# Patient Record
Sex: MALE | Race: WHITE | NOT HISPANIC OR LATINO | Employment: UNEMPLOYED | URBAN - METROPOLITAN AREA
[De-identification: names, ages, dates, MRNs, and addresses within clinical notes are randomized per-mention and may not be internally consistent; named-entity substitution may affect disease eponyms.]

---

## 2017-11-20 PROBLEM — R31.0 GROSS HEMATURIA: Status: ACTIVE | Noted: 2017-11-20

## 2017-11-20 PROBLEM — N41.1 PROSTATITIS, CHRONIC: Status: ACTIVE | Noted: 2017-11-20

## 2017-12-13 PROBLEM — M48.061 LUMBAR STENOSIS: Status: ACTIVE | Noted: 2017-12-13

## 2018-02-21 PROBLEM — M47.816 LUMBAR SPONDYLOSIS: Status: ACTIVE | Noted: 2018-02-21

## 2018-06-06 PROBLEM — Z98.1 S/P LUMBAR SPINAL FUSION: Status: ACTIVE | Noted: 2018-06-06

## 2019-09-20 ENCOUNTER — HOSPITAL ENCOUNTER (EMERGENCY)
Facility: MEDICAL CENTER | Age: 54
End: 2019-09-20

## 2019-09-20 VITALS
TEMPERATURE: 97.2 F | HEIGHT: 66 IN | BODY MASS INDEX: 24.11 KG/M2 | HEART RATE: 80 BPM | WEIGHT: 150 LBS | DIASTOLIC BLOOD PRESSURE: 81 MMHG | SYSTOLIC BLOOD PRESSURE: 135 MMHG | RESPIRATION RATE: 14 BRPM | OXYGEN SATURATION: 95 %

## 2019-09-20 PROCEDURE — 302449 STATCHG TRIAGE ONLY (STATISTIC)

## 2019-09-21 NOTE — ED TRIAGE NOTES
"C/o blood in urine x several years comes on and off. \"huge clots come out\".  Denies chest pain, sob, n/v, fever sweat or chills.   "

## 2020-05-04 PROBLEM — R35.0 FREQUENCY OF MICTURITION: Status: ACTIVE | Noted: 2020-05-04

## 2020-05-18 PROBLEM — R35.0 BENIGN PROSTATIC HYPERPLASIA WITH URINARY FREQUENCY: Status: ACTIVE | Noted: 2020-05-18

## 2020-05-18 PROBLEM — N40.1 BENIGN PROSTATIC HYPERPLASIA WITH URINARY FREQUENCY: Status: ACTIVE | Noted: 2020-05-18

## 2020-06-15 PROBLEM — D69.6 THROMBOCYTOPENIA (HCC): Status: ACTIVE | Noted: 2020-06-15

## 2020-06-15 PROBLEM — Z87.19 HX OF DIVERTICULITIS OF COLON: Status: ACTIVE | Noted: 2020-06-15

## 2020-06-15 PROBLEM — K76.0 FATTY LIVER: Status: ACTIVE | Noted: 2020-06-15

## 2020-06-15 PROBLEM — I10 HTN (HYPERTENSION): Status: ACTIVE | Noted: 2020-06-15

## 2020-06-15 PROBLEM — J30.9 ALLERGIC RHINITIS: Status: ACTIVE | Noted: 2020-06-15

## 2020-06-15 PROBLEM — L40.9 PSORIASIS: Status: ACTIVE | Noted: 2020-06-15

## 2020-06-15 PROBLEM — K21.9 GERD (GASTROESOPHAGEAL REFLUX DISEASE): Status: ACTIVE | Noted: 2020-06-15

## 2020-06-15 PROBLEM — Z72.0 TOBACCO USE: Status: ACTIVE | Noted: 2020-06-15

## 2020-06-15 PROBLEM — F10.10 ALCOHOL ABUSE: Status: ACTIVE | Noted: 2020-06-15

## 2020-10-16 PROBLEM — Z20.822 SUSPECTED COVID-19 VIRUS INFECTION: Status: ACTIVE | Noted: 2020-10-16

## 2020-10-16 PROBLEM — R55 SYNCOPE: Status: ACTIVE | Noted: 2020-10-16

## 2020-10-16 PROBLEM — R16.0 LIVER MASS: Status: ACTIVE | Noted: 2020-10-16

## 2020-10-20 PROBLEM — B34.9 VIRAL ILLNESS: Status: ACTIVE | Noted: 2020-10-20

## 2020-10-20 PROBLEM — Z20.822 SUSPECTED COVID-19 VIRUS INFECTION: Status: RESOLVED | Noted: 2020-10-16 | Resolved: 2020-10-20

## 2020-11-06 PROBLEM — Z00.00 HEALTH CARE MAINTENANCE: Status: ACTIVE | Noted: 2020-11-06

## 2020-11-06 PROBLEM — R19.00 ABDOMINAL LUMP: Status: ACTIVE | Noted: 2020-11-06

## 2021-02-05 PROBLEM — G89.29 CHRONIC RIGHT SHOULDER PAIN: Status: ACTIVE | Noted: 2021-02-05

## 2021-02-05 PROBLEM — M25.511 CHRONIC RIGHT SHOULDER PAIN: Status: ACTIVE | Noted: 2021-02-05

## 2021-02-08 PROBLEM — K29.70 GASTRITIS: Status: ACTIVE | Noted: 2021-02-08

## 2021-10-01 PROBLEM — C22.0 HEPATOCELLULAR CARCINOMA (HCC): Chronic | Status: RESOLVED | Noted: 2021-10-01 | Resolved: 2021-10-01

## 2021-10-01 PROBLEM — C22.0 HEPATOCELLULAR CARCINOMA (HCC): Chronic | Status: ACTIVE | Noted: 2021-10-01

## 2021-10-11 PROBLEM — K26.9 DUODENAL ULCER: Status: ACTIVE | Noted: 2021-10-11

## 2021-10-11 PROBLEM — R10.13 EPIGASTRIC PAIN: Status: ACTIVE | Noted: 2021-10-11

## 2021-10-11 PROBLEM — D53.9 MACROCYTIC ANEMIA: Status: ACTIVE | Noted: 2021-10-11

## 2022-04-22 PROBLEM — E80.6 HYPERBILIRUBINEMIA: Status: ACTIVE | Noted: 2022-04-22

## 2022-04-23 PROBLEM — R79.1 ELEVATED INR: Status: ACTIVE | Noted: 2022-04-23

## 2022-04-23 PROBLEM — R79.89 ELEVATED LFTS: Status: ACTIVE | Noted: 2022-04-23

## 2022-04-23 PROBLEM — E87.1 HYPONATREMIA: Status: ACTIVE | Noted: 2022-04-23
